# Patient Record
Sex: FEMALE | Race: WHITE | NOT HISPANIC OR LATINO | Employment: FULL TIME | URBAN - METROPOLITAN AREA
[De-identification: names, ages, dates, MRNs, and addresses within clinical notes are randomized per-mention and may not be internally consistent; named-entity substitution may affect disease eponyms.]

---

## 2017-03-17 ENCOUNTER — HOSPITAL ENCOUNTER (EMERGENCY)
Facility: HOSPITAL | Age: 62
Discharge: HOME | End: 2017-03-17
Attending: EMERGENCY MEDICINE | Admitting: EMERGENCY MEDICINE

## 2017-03-17 ENCOUNTER — APPOINTMENT (OUTPATIENT)
Dept: CT IMAGING | Facility: HOSPITAL | Age: 62
End: 2017-03-17

## 2017-03-17 ENCOUNTER — APPOINTMENT (OUTPATIENT)
Dept: GENERAL RADIOLOGY | Facility: HOSPITAL | Age: 62
End: 2017-03-17

## 2017-03-17 VITALS
RESPIRATION RATE: 18 BRPM | HEIGHT: 65 IN | TEMPERATURE: 97.8 F | DIASTOLIC BLOOD PRESSURE: 73 MMHG | HEART RATE: 62 BPM | OXYGEN SATURATION: 96 % | SYSTOLIC BLOOD PRESSURE: 114 MMHG | BODY MASS INDEX: 39.15 KG/M2 | WEIGHT: 235 LBS

## 2017-03-17 DIAGNOSIS — R51.9 ACUTE NONINTRACTABLE HEADACHE, UNSPECIFIED HEADACHE TYPE: ICD-10-CM

## 2017-03-17 DIAGNOSIS — R53.1 GENERALIZED WEAKNESS: Primary | ICD-10-CM

## 2017-03-17 DIAGNOSIS — E86.0 DEHYDRATION: ICD-10-CM

## 2017-03-17 LAB
ALT SERPL W P-5'-P-CCNC: 26 U/L (ref 7–40)
APTT PPP: 28.7 SECONDS (ref 24–31)
AST SERPL-CCNC: 28 U/L (ref 0–33)
BASOPHILS # BLD AUTO: 0.02 10*3/MM3 (ref 0–0.2)
BASOPHILS NFR BLD AUTO: 0.3 % (ref 0–1)
BILIRUB UR QL STRIP: NEGATIVE
BUN BLDA-MCNC: 11 MG/DL (ref 8–26)
CA-I BLDA-SCNC: 1.18 MMOL/L (ref 1.2–1.32)
CHLORIDE BLDA-SCNC: 103 MMOL/L (ref 98–109)
CLARITY UR: CLEAR
CO2 BLDA-SCNC: 26 MMOL/L (ref 24–29)
COLOR UR: YELLOW
CREAT BLDA-MCNC: 0.6 MG/DL (ref 0.6–1.3)
DEPRECATED RDW RBC AUTO: 44.5 FL (ref 37–54)
EOSINOPHIL # BLD AUTO: 0.13 10*3/MM3 (ref 0.1–0.3)
EOSINOPHIL NFR BLD AUTO: 1.9 % (ref 0–3)
ERYTHROCYTE [DISTWIDTH] IN BLOOD BY AUTOMATED COUNT: 14.3 % (ref 11.3–14.5)
GLUCOSE BLDC GLUCOMTR-MCNC: 112 MG/DL (ref 70–130)
GLUCOSE UR STRIP-MCNC: ABNORMAL MG/DL
HCT VFR BLD AUTO: 38.9 % (ref 34.5–44)
HCT VFR BLDA CALC: 38 % (ref 38–51)
HGB BLD-MCNC: 12.3 G/DL (ref 11.5–15.5)
HGB BLDA-MCNC: 12.9 G/DL (ref 12–17)
HGB UR QL STRIP.AUTO: NEGATIVE
HOLD SPECIMEN: NORMAL
HOLD SPECIMEN: NORMAL
IMM GRANULOCYTES # BLD: 0.01 10*3/MM3 (ref 0–0.03)
IMM GRANULOCYTES NFR BLD: 0.1 % (ref 0–0.6)
INR PPP: 0.9 (ref 0.8–1.2)
KETONES UR QL STRIP: NEGATIVE
LEUKOCYTE ESTERASE UR QL STRIP.AUTO: NEGATIVE
LYMPHOCYTES # BLD AUTO: 1.71 10*3/MM3 (ref 0.6–4.8)
LYMPHOCYTES NFR BLD AUTO: 24.7 % (ref 24–44)
MCH RBC QN AUTO: 27.3 PG (ref 27–31)
MCHC RBC AUTO-ENTMCNC: 31.6 G/DL (ref 32–36)
MCV RBC AUTO: 86.3 FL (ref 80–99)
MONOCYTES # BLD AUTO: 0.62 10*3/MM3 (ref 0–1)
MONOCYTES NFR BLD AUTO: 9 % (ref 0–12)
NEUTROPHILS # BLD AUTO: 4.43 10*3/MM3 (ref 1.5–8.3)
NEUTROPHILS NFR BLD AUTO: 64 % (ref 41–71)
NITRITE UR QL STRIP: NEGATIVE
PH UR STRIP.AUTO: 5.5 [PH] (ref 5–8)
PLATELET # BLD AUTO: 311 10*3/MM3 (ref 150–450)
PMV BLD AUTO: 10.2 FL (ref 6–12)
POTASSIUM BLDA-SCNC: 4.1 MMOL/L (ref 3.5–4.9)
PROT UR QL STRIP: NEGATIVE
PROTHROMBIN TIME: 10.9 SECONDS (ref 12.8–15.2)
RBC # BLD AUTO: 4.51 10*6/MM3 (ref 3.89–5.14)
SODIUM BLDA-SCNC: 140 MMOL/L (ref 138–146)
SP GR UR STRIP: 1.04 (ref 1–1.03)
T4 SERPL-MCNC: 6.5 MCG/DL (ref 4.7–11.4)
TROPONIN I SERPL-MCNC: 0 NG/ML (ref 0–0.07)
TSH SERPL DL<=0.05 MIU/L-ACNC: 2.1 MIU/ML (ref 0.35–5.35)
UROBILINOGEN UR QL STRIP: ABNORMAL
WBC NRBC COR # BLD: 6.92 10*3/MM3 (ref 3.5–10.8)
WHOLE BLOOD HOLD SPECIMEN: NORMAL
WHOLE BLOOD HOLD SPECIMEN: NORMAL

## 2017-03-17 PROCEDURE — 85014 HEMATOCRIT: CPT

## 2017-03-17 PROCEDURE — 85610 PROTHROMBIN TIME: CPT

## 2017-03-17 PROCEDURE — 84484 ASSAY OF TROPONIN QUANT: CPT

## 2017-03-17 PROCEDURE — 84450 TRANSFERASE (AST) (SGOT): CPT | Performed by: EMERGENCY MEDICINE

## 2017-03-17 PROCEDURE — 80047 BASIC METABLC PNL IONIZED CA: CPT

## 2017-03-17 PROCEDURE — 71010 HC CHEST PA OR AP: CPT

## 2017-03-17 PROCEDURE — 85730 THROMBOPLASTIN TIME PARTIAL: CPT | Performed by: EMERGENCY MEDICINE

## 2017-03-17 PROCEDURE — 84436 ASSAY OF TOTAL THYROXINE: CPT | Performed by: EMERGENCY MEDICINE

## 2017-03-17 PROCEDURE — P9612 CATHETERIZE FOR URINE SPEC: HCPCS

## 2017-03-17 PROCEDURE — 70450 CT HEAD/BRAIN W/O DYE: CPT

## 2017-03-17 PROCEDURE — 85025 COMPLETE CBC W/AUTO DIFF WBC: CPT | Performed by: EMERGENCY MEDICINE

## 2017-03-17 PROCEDURE — 84460 ALANINE AMINO (ALT) (SGPT): CPT | Performed by: EMERGENCY MEDICINE

## 2017-03-17 PROCEDURE — 81003 URINALYSIS AUTO W/O SCOPE: CPT | Performed by: EMERGENCY MEDICINE

## 2017-03-17 PROCEDURE — 93005 ELECTROCARDIOGRAM TRACING: CPT | Performed by: EMERGENCY MEDICINE

## 2017-03-17 PROCEDURE — 84443 ASSAY THYROID STIM HORMONE: CPT | Performed by: EMERGENCY MEDICINE

## 2017-03-17 PROCEDURE — 99285 EMERGENCY DEPT VISIT HI MDM: CPT

## 2017-03-17 RX ORDER — RAMIPRIL 10 MG/1
10 CAPSULE ORAL DAILY
COMMUNITY

## 2017-03-17 RX ORDER — ESCITALOPRAM OXALATE 20 MG/1
20 TABLET ORAL DAILY
COMMUNITY

## 2017-03-17 RX ORDER — OMEPRAZOLE 20 MG/1
20 CAPSULE, DELAYED RELEASE ORAL DAILY
COMMUNITY

## 2017-03-17 RX ORDER — SODIUM CHLORIDE 0.9 % (FLUSH) 0.9 %
10 SYRINGE (ML) INJECTION AS NEEDED
Status: DISCONTINUED | OUTPATIENT
Start: 2017-03-17 | End: 2017-03-17 | Stop reason: HOSPADM

## 2017-03-17 RX ORDER — ASPIRIN 81 MG/1
81 TABLET ORAL DAILY
COMMUNITY

## 2017-03-17 RX ORDER — INSULIN GLARGINE 100 [IU]/ML
45 INJECTION, SOLUTION SUBCUTANEOUS NIGHTLY
COMMUNITY

## 2017-03-17 RX ADMIN — SODIUM CHLORIDE 1000 ML: 9 INJECTION, SOLUTION INTRAVENOUS at 12:30

## 2017-03-17 NOTE — DISCHARGE INSTRUCTIONS
Push fluids. If you become light-headed lie down and raise your legs.   Follow up with Dr. Brock at next available, call to make an appointment.   Return to ED for worsening concerns or if new concerns may arise.      Please review the medications you are supposed to be taking according to prior physician recommendations. I have not changed your home medications during this visit. If your discharge instructions indicate that I have changed your home medications, this is not the case, and you should disregard. If you have any questions about the medication you should be taking at home, please call your physician.

## 2017-03-17 NOTE — ED PROVIDER NOTES
Subjective   HPI Comments: Kendra Lares is a 61 y.o.female who presents to the ED with multiple complaints. The patient reports she woke up this morning at 0400 with a headache and pain in posterior head. She notes she took advil at that time, with relief. She states she went to work and continued to experience worsening generalized weakness, presenting into the ED for evaluation. Additionally, the patient c/o light-headedness, dizziness, ataxia, blurred vision, and a heaviness in her chest, but denies any other acute complaints at this time.   The patient reports she had two eye surgeries on her right eye at the end of last year due to a detached retina.     Patient is a 61 y.o. female presenting with weakness.   History provided by:  Patient  Weakness - Generalized   Severity:  Moderate  Onset quality:  Sudden  Duration: 0830 while at work.  Timing:  Constant  Progression:  Worsening  Chronicity:  New  Context comment:  Began experiencing headache at 0400 this morning, while at work experienced worsening generalized weakness  Relieved by:  None tried  Ineffective treatments:  None tried  Associated symptoms: ataxia, chest pain (heaviness in chest), dizziness, headaches and vision change (blurred vision)    Associated symptoms: no cough, no diarrhea, no falls, no fever, no loss of consciousness, no nausea, no shortness of breath and no vomiting        Review of Systems   Constitutional: Negative for chills and fever.   HENT: Negative for congestion, rhinorrhea and sore throat.    Respiratory: Negative for cough and shortness of breath.    Cardiovascular: Positive for chest pain (heaviness in chest).   Gastrointestinal: Negative for diarrhea, nausea and vomiting.   Musculoskeletal: Positive for gait problem. Negative for back pain, falls and neck pain.   Neurological: Positive for dizziness, weakness (generalized), light-headedness and headaches. Negative for loss of consciousness.   All other systems reviewed and  are negative.      Past Medical History   Diagnosis Date   • Diabetes mellitus    • Diverticulosis    • GERD (gastroesophageal reflux disease)    • Hypertension    • Migraine        No Known Allergies    Past Surgical History   Procedure Laterality Date   • Retinal detachment repair     • Colon resection     • Hernia repair         History reviewed. No pertinent family history.    Social History     Social History   • Marital status:      Spouse name: N/A   • Number of children: N/A   • Years of education: N/A     Social History Main Topics   • Smoking status: Never Smoker   • Smokeless tobacco: None   • Alcohol use No   • Drug use: No   • Sexual activity: Not Asked     Other Topics Concern   • None     Social History Narrative   • None         Objective   Physical Exam   Constitutional: She is oriented to person, place, and time. She appears well-developed and well-nourished. No distress.   NIH 0    HENT:   Head: Normocephalic and atraumatic.   Eyes: Conjunctivae are normal. No scleral icterus.   Neck: Normal range of motion. Neck supple. Carotid bruit is not present.   Cardiovascular: Normal rate, regular rhythm and normal heart sounds.    Pulmonary/Chest: Effort normal and breath sounds normal. No respiratory distress.   Abdominal: Soft. Bowel sounds are normal. There is no tenderness.   Musculoskeletal: Normal range of motion. She exhibits no edema.   Neurological: She is alert and oriented to person, place, and time.   The patient is slightly slow to answer questions.   Oriented x 3.   Skin: Skin is warm and dry.   Psychiatric: She has a normal mood and affect.   Nursing note and vitals reviewed.      Procedures         ED Course  ED Course   Comment By Time   Upon re-check patient reports she is feeling a little bit better. -REBECCA Brewster [facility] 03/17 8307   Discussed results and plan of care with the patient, who is agreeable with the plan. -REBECCA Brewster [facility] 03/17 5220   Patient was given  1L normal saline, and upon re-check she has not felt urged to urinate at that time. Spoke with her in detail about proper hydration status and mandatory re-check if symptoms return/worsen. -REBECCA Nevarez 03/17 1634     Recent Results (from the past 24 hour(s))   POC Protime / INR    Collection Time: 03/17/17 11:09 AM   Result Value Ref Range    Protime 10.9 (L) 12.8 - 15.2 seconds    INR 0.9 0.8 - 1.2   POC CHEM 8    Collection Time: 03/17/17 11:10 AM   Result Value Ref Range    Glucose 112 70 - 130 mg/dL    BUN, Arterial 11 8 - 26 mg/dL    Creatinine 0.60 0.60 - 1.30 mg/dL    Sodium 140 138 - 146 mmol/L    Potassium 4.1 3.5 - 4.9 mmol/L    Chloride 103 98 - 109 mmol/L    Total CO2 26 24 - 29 mmol/L    Hemoglobin 12.9 12.0 - 17.0 g/dL    Hematocrit 38 38 - 51 %    Ionized Calcium 1.18 (L) 1.20 - 1.32 mmol/L   POC Troponin, Rapid    Collection Time: 03/17/17 11:11 AM   Result Value Ref Range    Troponin I 0.00 0.00 - 0.07 ng/mL   AST    Collection Time: 03/17/17 11:16 AM   Result Value Ref Range    AST (SGOT) 28 0 - 33 U/L   ALT    Collection Time: 03/17/17 11:16 AM   Result Value Ref Range    ALT (SGPT) 26 7 - 40 U/L   Green Top (Gel)    Collection Time: 03/17/17 11:16 AM   Result Value Ref Range    Extra Tube Hold for add-ons.    TSH    Collection Time: 03/17/17 11:16 AM   Result Value Ref Range    TSH 2.102 0.350 - 5.350 mIU/mL   T4    Collection Time: 03/17/17 11:16 AM   Result Value Ref Range    T4, Total 6.50 4.70 - 11.40 mcg/dL   aPTT    Collection Time: 03/17/17 11:17 AM   Result Value Ref Range    PTT 28.7 24.0 - 31.0 seconds   Light Blue Top    Collection Time: 03/17/17 11:17 AM   Result Value Ref Range    Extra Tube hold for add-on    Lavender Top    Collection Time: 03/17/17 11:17 AM   Result Value Ref Range    Extra Tube hold for add-on    Gold Top - SST    Collection Time: 03/17/17 11:17 AM   Result Value Ref Range    Extra Tube Hold for add-ons.    CBC Auto Differential    Collection Time:  03/17/17 11:17 AM   Result Value Ref Range    WBC 6.92 3.50 - 10.80 10*3/mm3    RBC 4.51 3.89 - 5.14 10*6/mm3    Hemoglobin 12.3 11.5 - 15.5 g/dL    Hematocrit 38.9 34.5 - 44.0 %    MCV 86.3 80.0 - 99.0 fL    MCH 27.3 27.0 - 31.0 pg    MCHC 31.6 (L) 32.0 - 36.0 g/dL    RDW 14.3 11.3 - 14.5 %    RDW-SD 44.5 37.0 - 54.0 fl    MPV 10.2 6.0 - 12.0 fL    Platelets 311 150 - 450 10*3/mm3    Neutrophil % 64.0 41.0 - 71.0 %    Lymphocyte % 24.7 24.0 - 44.0 %    Monocyte % 9.0 0.0 - 12.0 %    Eosinophil % 1.9 0.0 - 3.0 %    Basophil % 0.3 0.0 - 1.0 %    Immature Grans % 0.1 0.0 - 0.6 %    Neutrophils, Absolute 4.43 1.50 - 8.30 10*3/mm3    Lymphocytes, Absolute 1.71 0.60 - 4.80 10*3/mm3    Monocytes, Absolute 0.62 0.00 - 1.00 10*3/mm3    Eosinophils, Absolute 0.13 0.10 - 0.30 10*3/mm3    Basophils, Absolute 0.02 0.00 - 0.20 10*3/mm3    Immature Grans, Absolute 0.01 0.00 - 0.03 10*3/mm3   Urinalysis With / Culture If Indicated    Collection Time: 03/17/17  2:45 PM   Result Value Ref Range    Color, UA Yellow Yellow, Straw    Appearance, UA Clear Clear    pH, UA 5.5 5.0 - 8.0    Specific Gravity, UA 1.036 (H) 1.001 - 1.030    Glucose, UA >=1000 mg/dL (3+) (A) Negative    Ketones, UA Negative Negative    Bilirubin, UA Negative Negative    Blood, UA Negative Negative    Protein, UA Negative Negative    Leuk Esterase, UA Negative Negative    Nitrite, UA Negative Negative    Urobilinogen, UA 0.2 E.U./dL 0.2 - 1.0 E.U./dL     Note: In addition to lab results from this visit, the labs listed above may include labs taken at another facility or during a different encounter within the last 24 hours. Please correlate lab times with ED admission and discharge times for further clarification of the services performed during this visit.    XR Chest 1 View   Preliminary Result   1.  Borderline heart.   2.  No active disease.       D:  03/17/2017   E:  03/17/2017                  CT Head Without Contrast   Final Result   1. Negative  noncontrast CT data set of the brain. Evolving ischemic   insult or hemorrhage is not currently identified.   2. Datasets were complete at 11:00 AM and the report rendered   immediately to the emergency physician in attendance.       D:  03/17/2017   E:  03/17/2017           This report was finalized on 3/17/2017 3:29 PM by Dr. Peter May MD.            Vitals:    03/17/17 1515 03/17/17 1530 03/17/17 1544 03/17/17 1600   BP:  118/67  113/55   BP Location:       Patient Position:       Pulse: 60  60 65   Resp:       Temp:       TempSrc:       SpO2: 92% 91% 92% 95%   Weight:       Height:         Medications   sodium chloride 0.9 % flush 10 mL (not administered)   sodium chloride 0.9 % bolus 1,000 mL (0 mL Intravenous Stopped 3/17/17 1345)     ECG/EMG Results (last 24 hours)     Procedure Component Value Units Date/Time    ECG 12 Lead [[id number]] Collected:  03/17/17 1126     Updated:  03/17/17 1322    Narrative:       Test Reason : Acute Stroke Protocol (onset < 12 hrs)  Blood Pressure : **/** mmHG  Vent. Rate : 062 BPM     Atrial Rate : 062 BPM     P-R Int : 144 ms          QRS Dur : 080 ms      QT Int : 424 ms       P-R-T Axes : 006 -11 022 degrees     QTc Int : 430 ms    Normal sinus rhythm  Normal ECG  When compared with ECG of 17-AUG-2014 22:31,  No significant change was found  Confirmed by SHANNEN DEL ROSARIO MD ([id number]) on 3/17/2017 1:22:03 PM    Referred By:  PELON           Confirmed By:SHANNEN DEL ROSARIO MD                  [facility]    Final diagnoses:   Generalized weakness   Dehydration   Acute nonintractable headache, unspecified headache type       Documentation assistance provided by turner Nevarez.  Information recorded by the scribe was done at my direction and has been verified and validated by me.     Georgina Nevarez  03/17/17 1120       Georgina Nevarez  03/17/17 1638       Georgina Nevarez  03/17/17 1654       Shannen Del Rosario MD  03/17/17 8777

## 2017-07-19 ENCOUNTER — HOSPITAL ENCOUNTER (OUTPATIENT)
Facility: HOSPITAL | Age: 62
Setting detail: HOSPITAL OUTPATIENT SURGERY
Discharge: HOME | End: 2017-07-19
Attending: OPHTHALMOLOGY | Admitting: OPHTHALMOLOGY

## 2017-07-19 ENCOUNTER — ANESTHESIA EVENT (OUTPATIENT)
Dept: PERIOP | Facility: HOSPITAL | Age: 62
End: 2017-07-19

## 2017-07-19 ENCOUNTER — ANESTHESIA (OUTPATIENT)
Dept: PERIOP | Facility: HOSPITAL | Age: 62
End: 2017-07-19

## 2017-07-19 VITALS
SYSTOLIC BLOOD PRESSURE: 155 MMHG | HEIGHT: 63 IN | BODY MASS INDEX: 38.62 KG/M2 | DIASTOLIC BLOOD PRESSURE: 75 MMHG | OXYGEN SATURATION: 98 % | WEIGHT: 218 LBS | HEART RATE: 54 BPM | TEMPERATURE: 98.1 F | RESPIRATION RATE: 16 BRPM

## 2017-07-19 PROBLEM — H26.9 CATARACT OF RIGHT EYE: Status: ACTIVE | Noted: 2017-07-19

## 2017-07-19 PROBLEM — H26.9 CATARACT OF RIGHT EYE: Status: RESOLVED | Noted: 2017-07-19 | Resolved: 2017-07-19

## 2017-07-19 LAB — GLUCOSE BLDC GLUCOMTR-MCNC: 97 MG/DL (ref 70–130)

## 2017-07-19 PROCEDURE — 82962 GLUCOSE BLOOD TEST: CPT

## 2017-07-19 PROCEDURE — 25010000002 EPINEPHRINE 1 MG/ML SOLUTION 1 ML VIAL: Performed by: OPHTHALMOLOGY

## 2017-07-19 PROCEDURE — 25010000002 MIDAZOLAM PER 1 MG: Performed by: NURSE ANESTHETIST, CERTIFIED REGISTERED

## 2017-07-19 PROCEDURE — V2787 ASTIGMATISM-CORRECT FUNCTION: HCPCS | Performed by: OPHTHALMOLOGY

## 2017-07-19 PROCEDURE — V2632 POST CHMBR INTRAOCULAR LENS: HCPCS | Performed by: OPHTHALMOLOGY

## 2017-07-19 DEVICE — IMPLANTABLE DEVICE: Type: IMPLANTABLE DEVICE | Site: EYE | Status: FUNCTIONAL

## 2017-07-19 RX ORDER — PREDNISOLONE ACETATE 10 MG/ML
SUSPENSION/ DROPS OPHTHALMIC AS NEEDED
Status: DISCONTINUED | OUTPATIENT
Start: 2017-07-19 | End: 2017-07-19 | Stop reason: HOSPADM

## 2017-07-19 RX ORDER — SODIUM CHLORIDE, SODIUM LACTATE, POTASSIUM CHLORIDE, CALCIUM CHLORIDE 600; 310; 30; 20 MG/100ML; MG/100ML; MG/100ML; MG/100ML
1000 INJECTION, SOLUTION INTRAVENOUS CONTINUOUS PRN
Status: DISCONTINUED | OUTPATIENT
Start: 2017-07-19 | End: 2017-08-10 | Stop reason: HOSPADM

## 2017-07-19 RX ORDER — BALANCED SALT SOLUTION 6.4; .75; .48; .3; 3.9; 1.7 MG/ML; MG/ML; MG/ML; MG/ML; MG/ML; MG/ML
SOLUTION OPHTHALMIC AS NEEDED
Status: DISCONTINUED | OUTPATIENT
Start: 2017-07-19 | End: 2017-07-19 | Stop reason: HOSPADM

## 2017-07-19 RX ORDER — TETRACAINE HYDROCHLORIDE 5 MG/ML
1 SOLUTION OPHTHALMIC SEE ADMIN INSTRUCTIONS
Status: COMPLETED | OUTPATIENT
Start: 2017-07-19 | End: 2017-07-19

## 2017-07-19 RX ORDER — SODIUM CHLORIDE 0.9 % (FLUSH) 0.9 %
1-10 SYRINGE (ML) INJECTION AS NEEDED
Status: DISCONTINUED | OUTPATIENT
Start: 2017-07-19 | End: 2017-08-10 | Stop reason: HOSPADM

## 2017-07-19 RX ORDER — PREDNISOLONE ACETATE 10 MG/ML
SUSPENSION/ DROPS OPHTHALMIC
Status: DISPENSED
Start: 2017-07-19 | End: 2017-07-19

## 2017-07-19 RX ORDER — PILOCARPINE HYDROCHLORIDE 10 MG/ML
SOLUTION/ DROPS OPHTHALMIC AS NEEDED
Status: DISCONTINUED | OUTPATIENT
Start: 2017-07-19 | End: 2017-07-19 | Stop reason: HOSPADM

## 2017-07-19 RX ORDER — CYCLOPENTOLATE HYDROCHLORIDE 20 MG/ML
SOLUTION/ DROPS OPHTHALMIC
Status: COMPLETED
Start: 2017-07-19 | End: 2017-07-19

## 2017-07-19 RX ORDER — PHENYLEPHRINE HYDROCHLORIDE 100 MG/ML
SOLUTION/ DROPS OPHTHALMIC
Status: COMPLETED
Start: 2017-07-19 | End: 2017-07-19

## 2017-07-19 RX ORDER — LIDOCAINE HYDROCHLORIDE 40 MG/ML
INJECTION, SOLUTION RETROBULBAR; TOPICAL AS NEEDED
Status: DISCONTINUED | OUTPATIENT
Start: 2017-07-19 | End: 2017-07-19 | Stop reason: HOSPADM

## 2017-07-19 RX ORDER — PREDNISOLONE ACETATE 10 MG/ML
1 SUSPENSION/ DROPS OPHTHALMIC
Status: DISCONTINUED | OUTPATIENT
Start: 2017-07-19 | End: 2017-08-10 | Stop reason: HOSPADM

## 2017-07-19 RX ORDER — ENALAPRILAT 2.5 MG/2ML
1.25 INJECTION INTRAVENOUS ONCE AS NEEDED
Status: DISCONTINUED | OUTPATIENT
Start: 2017-07-19 | End: 2017-08-10 | Stop reason: HOSPADM

## 2017-07-19 RX ORDER — TETRACAINE HYDROCHLORIDE 5 MG/ML
SOLUTION OPHTHALMIC
Status: COMPLETED
Start: 2017-07-19 | End: 2017-07-19

## 2017-07-19 RX ORDER — CYCLOPENTOLATE HYDROCHLORIDE 20 MG/ML
1 SOLUTION/ DROPS OPHTHALMIC
Status: COMPLETED | OUTPATIENT
Start: 2017-07-19 | End: 2017-07-19

## 2017-07-19 RX ORDER — PHENYLEPHRINE HYDROCHLORIDE 100 MG/ML
1 SOLUTION/ DROPS OPHTHALMIC
Status: COMPLETED | OUTPATIENT
Start: 2017-07-19 | End: 2017-07-19

## 2017-07-19 RX ORDER — MIDAZOLAM HYDROCHLORIDE 1 MG/ML
INJECTION INTRAMUSCULAR; INTRAVENOUS AS NEEDED
Status: DISCONTINUED | OUTPATIENT
Start: 2017-07-19 | End: 2017-07-19 | Stop reason: SURG

## 2017-07-19 RX ORDER — SODIUM CHLORIDE 0.9 % (FLUSH) 0.9 %
3 SYRINGE (ML) INJECTION AS NEEDED
Status: DISCONTINUED | OUTPATIENT
Start: 2017-07-19 | End: 2017-08-10 | Stop reason: HOSPADM

## 2017-07-19 RX ORDER — GATIFLOXACIN 5 MG/ML
1 SOLUTION/ DROPS OPHTHALMIC 4 TIMES DAILY
COMMUNITY
End: 2018-07-24 | Stop reason: HOSPADM

## 2017-07-19 RX ADMIN — CYCLOPENTOLATE HYDROCHLORIDE 1 DROP: 20 SOLUTION/ DROPS OPHTHALMIC at 09:52

## 2017-07-19 RX ADMIN — SODIUM CHLORIDE, POTASSIUM CHLORIDE, SODIUM LACTATE AND CALCIUM CHLORIDE 1000 ML: 600; 310; 30; 20 INJECTION, SOLUTION INTRAVENOUS at 10:08

## 2017-07-19 RX ADMIN — TETRACAINE HYDROCHLORIDE 1 DROP: 5 SOLUTION OPHTHALMIC at 09:49

## 2017-07-19 RX ADMIN — TETRACAINE HYDROCHLORIDE 1 DROP: 5 SOLUTION OPHTHALMIC at 09:50

## 2017-07-19 RX ADMIN — MIDAZOLAM HYDROCHLORIDE 1 MG: 1 INJECTION, SOLUTION INTRAMUSCULAR; INTRAVENOUS at 11:15

## 2017-07-19 RX ADMIN — PHENYLEPHRINE HYDROCHLORIDE 1 DROP: 100 SOLUTION/ DROPS OPHTHALMIC at 09:52

## 2017-07-19 RX ADMIN — PHENYLEPHRINE HYDROCHLORIDE 1 DROP: 100 SOLUTION/ DROPS OPHTHALMIC at 10:02

## 2017-07-19 RX ADMIN — MIDAZOLAM HYDROCHLORIDE 1 MG: 1 INJECTION, SOLUTION INTRAMUSCULAR; INTRAVENOUS at 11:08

## 2017-07-19 RX ADMIN — PHENYLEPHRINE HYDROCHLORIDE 1 DROP: 100 SOLUTION/ DROPS OPHTHALMIC at 09:57

## 2017-07-19 RX ADMIN — MIDAZOLAM HYDROCHLORIDE 1 MG: 1 INJECTION, SOLUTION INTRAMUSCULAR; INTRAVENOUS at 11:22

## 2017-07-19 RX ADMIN — CYCLOPENTOLATE HYDROCHLORIDE 1 DROP: 20 SOLUTION/ DROPS OPHTHALMIC at 10:02

## 2017-07-19 RX ADMIN — CYCLOPENTOLATE HYDROCHLORIDE 1 DROP: 20 SOLUTION/ DROPS OPHTHALMIC at 09:57

## 2017-07-19 NOTE — ANESTHESIA PREPROCEDURE EVALUATION
Anesthesia Evaluation     Patient summary reviewed and Nursing notes reviewed   no history of anesthetic complications:  NPO Solid Status: > 8 hours  NPO Liquid Status: > 8 hours     Airway   Mallampati: II  Neck ROM: full  no difficulty expected  Dental - normal exam     Pulmonary - normal exam    breath sounds clear to auscultation  (+) sleep apnea on CPAP,   Cardiovascular - normal exam    PT is on anticoagulation therapy  Rhythm: regular  Rate: normal    (+) hypertension well controlled,       Neuro/Psych  (+) CVA, headaches, psychiatric history Depression,    GI/Hepatic/Renal/Endo    (+) obesity,  diabetes mellitus type 1,     Musculoskeletal (-) negative ROS    Abdominal    Substance History - negative use     OB/GYN negative ob/gyn ROS         Other      history of cancer remission    ROS/Med Hx Other: Hx of ovarian CA at age 20  BMI 38                                  Anesthesia Plan    ASA 3     MAC     Anesthetic plan and risks discussed with patient.

## 2017-07-19 NOTE — ANESTHESIA POSTPROCEDURE EVALUATION
Patient: Kendra Lares    Procedure Summary     Date Anesthesia Start Anesthesia Stop Room / Location    07/19/17 1107 1136  [facility] /  [facility]       Procedure Diagnosis Surgeon Provider    CATARACT PHACO EXTRACTION WITH INTRAOCULAR LENS IMPLANT RIGHT WITH TORIC LENS (Right Eye) No diagnosis on file. MD James Crisostomo CRNA          Anesthesia Type: MAC  Last vitals  BP   155/75 (07/19/17 1200)    Temp   98.1 °F (36.7 °C) (07/19/17 1140)    Pulse   54 (07/19/17 1200)   Resp   16 (07/19/17 1200)    SpO2   98 % (07/19/17 1200)      Post Anesthesia Care and Evaluation    Patient location during evaluation: bedside  Patient participation: complete - patient participated  Level of consciousness: awake and alert  Pain management: satisfactory to patient  Airway patency: patent  Anesthetic complications: No anesthetic complications  PONV Status: controlled  Cardiovascular status: acceptable and stable  Respiratory status: acceptable  Hydration status: acceptable

## 2017-07-31 ENCOUNTER — OFFICE VISIT (OUTPATIENT)
Dept: SURGERY | Facility: CLINIC | Age: 62
End: 2017-07-31

## 2017-07-31 VITALS
HEIGHT: 63 IN | DIASTOLIC BLOOD PRESSURE: 62 MMHG | BODY MASS INDEX: 39.23 KG/M2 | TEMPERATURE: 97.9 F | SYSTOLIC BLOOD PRESSURE: 120 MMHG | OXYGEN SATURATION: 98 % | RESPIRATION RATE: 16 BRPM | HEART RATE: 70 BPM | WEIGHT: 221.4 LBS

## 2017-07-31 DIAGNOSIS — K43.2 INCISIONAL HERNIA, WITHOUT OBSTRUCTION OR GANGRENE: Primary | ICD-10-CM

## 2017-07-31 PROCEDURE — 99204 OFFICE O/P NEW MOD 45 MIN: CPT | Performed by: SURGERY

## 2017-07-31 RX ORDER — NEPAFENAC 0.3 %
SUSPENSION, DROPS(FINAL DOSAGE FORM)(ML) OPHTHALMIC (EYE)
Refills: 0 | COMMUNITY
Start: 2017-07-11 | End: 2018-07-24 | Stop reason: HOSPADM

## 2017-07-31 RX ORDER — GLIPIZIDE 10 MG/1
TABLET, FILM COATED, EXTENDED RELEASE ORAL
Refills: 0 | COMMUNITY
Start: 2017-07-24

## 2017-07-31 NOTE — PROGRESS NOTES
Patient: Kendra Lares    YOB: 1955    Date: 07/31/2017    Primary Care Provider: Enmanuel Brock MD    Reason for Consultation: hernia    Chief complaint:   Chief Complaint   Patient presents with   • Hernia       Subjective .     History of present illness:  Patient had a hernia repair 6-7 years ago with Dr Roberts. She c/o a knot above her navel that protrudes more upon standing and with eating, drinking carbonated beverages and standing on her feet for long periods at a time. Her pain is constant and about a 4 on the pain scale. Patient had 2 colectomies in the past and laparoscopic incisional hernia repair.  She is concerned she has developed a recurrent hernia.    Review of Systems   Constitutional: Negative for chills, fever and unexpected weight change.   HENT: Negative for hearing loss, trouble swallowing and voice change.    Eyes: Negative for visual disturbance.   Respiratory: Negative for apnea, cough, chest tightness, shortness of breath and wheezing.    Cardiovascular: Negative for chest pain, palpitations and leg swelling.   Gastrointestinal: Positive for abdominal pain and nausea. Negative for abdominal distention, anal bleeding, blood in stool, constipation, diarrhea, rectal pain and vomiting.   Endocrine: Negative for cold intolerance and heat intolerance.   Genitourinary: Negative for difficulty urinating, dysuria and flank pain.   Musculoskeletal: Negative for back pain and gait problem.   Skin: Negative for color change, rash and wound.   Neurological: Negative for dizziness, syncope, speech difficulty, weakness, light-headedness, numbness and headaches.   Hematological: Negative for adenopathy. Does not bruise/bleed easily.   Psychiatric/Behavioral: Negative for confusion. The patient is not nervous/anxious.        Allergies:  No Known Allergies    Medications:  No current facility-administered medications for this visit.   No current outpatient prescriptions on  "file.    Facility-Administered Medications Ordered in Other Visits:   •  enalaprilat (VASOTEC) injection 1.25 mg, 1.25 mg, Intravenous, Once PRN, Aleja Wallace MD  •  lactated ringers infusion 1,000 mL, 1,000 mL, Intravenous, Continuous PRN, Aleja Wallace MD, Stopped at 07/19/17 1149  •  prednisoLONE acetate (PRED FORTE) 1 % ophthalmic suspension 1 drop, 1 drop, Right Eye, Q1H While Awake, Aleja Wallace MD  •  sodium chloride 0.9 % flush 1-10 mL, 1-10 mL, Intravenous, PRN, Aleja Wallace MD  •  sodium chloride 0.9 % flush 3 mL, 3 mL, Intravenous, PRN, Aleja Wallace MD    History\"  Past Medical History:   Diagnosis Date   • Body piercing     EARS   • Cancer     REPORTS PRECANCEROUS CELLS ON OVARIES AT AGE 20 - PATIENT HAD A COMPLETE HYSTERECTOMY AT AGE 20 DUE TO THIS   • Depression    • Diabetes mellitus    • Diverticulosis    • Fracture     RIGHT ANKLE   • GERD (gastroesophageal reflux disease)    • [place] (hard of hearing)     WEARS HEARING AIDS   • Hypertension    • Migraine    • Sleep apnea     USES CPAP HS   • Stroke     REPORTS HAD 2 EPISODES THAT HAVE NOT BEEN PROVEN AS CVA'S BUT THAT THEY THINK SHE HAD STROKES. MOST RECENT REPORTED TO BE NOVEMBER 2016.    • Wears partial dentures     UPPER MOUTH       Past Surgical History:   Procedure Laterality Date   • CATARACT EXTRACTION W/ INTRAOCULAR LENS IMPLANT Right 7/19/2017    Procedure: CATARACT PHACO EXTRACTION WITH INTRAOCULAR LENS IMPLANT RIGHT WITH TORIC LENS;  Surgeon: Aleja Wallace MD;  [facility]: [facility];  Service:    • COLON RESECTION      REPORTS 2 RESECTIONS BY DR LEAL   • COLONOSCOPY W/ POLYPECTOMY     • ENDOSCOPY     • HERNIA REPAIR      ABDOMINAL, REPORTS X2 SURGERIES IN THE PAST.  DR LEAL X1, DR AVERY X1.   • HYSTERECTOMY      TOTAL HYSTERECTOMY AT AGE 20   • LAPAROSCOPIC CHOLECYSTECTOMY     • RETINAL DETACHMENT REPAIR Right        History reviewed. No pertinent family " "history.    Social History   Substance Use Topics   • Smoking status: Former Smoker     Packs/day: 0.25     Years: 8.00     Types: Cigarettes   • Smokeless tobacco: Never Used      Comment: REPORTS SHE QUIT APPROXIMATELY 25 YEARS AGO, ONLY ON A SOCIAL LEVEL   • Alcohol use No        Objective     Vital Signs:   /62  Pulse 70  Temp 97.9 °F (36.6 °C) (Temporal Artery )   Resp 16  Ht 63\" (160 cm)  Wt 221 lb 6.4 oz (100 kg)  SpO2 98%  BMI 39.22 kg/m2    Physical Exam:   General Appearance:    Alert, cooperative, in no acute distress   Head:    Normocephalic, without obvious abnormality, atraumatic   Eyes:            Lids and lashes normal, conjunctivae and sclerae normal, no   icterus, no pallor, corneas clear, PERRLA   Ears:    Ears appear intact with no abnormalities noted   Throat:   No oral lesions, no thrush, oral mucosa moist   Neck:   No adenopathy, supple, trachea midline, no thyromegaly, no   carotid bruit, no JVD   Lungs:     Clear to auscultation,respirations regular, even and                  unlabored    Heart:    Regular rhythm and normal rate, normal S1 and S2, no            murmur, no gallop, no rub, no click   Chest Wall:    No abnormalities observed   Abdomen:     Normal bowel sounds, no masses, no organomegaly, soft        non-tender, non-distended, no guarding, no rebound                Tenderness.  Large periumbilical incisional hernia, 15 x 15 cm.  Difficult to reduce.     Extremities:   Moves all extremities well, no edema, no cyanosis, no             redness   Pulses:   Pulses palpable and equal bilaterally   Skin:   No bleeding, bruising or rash   Lymph nodes:   No palpable adenopathy   Neurologic:   Cranial nerves 2 - 12 grossly intact, sensation intact, DTR       present and equal bilaterally     Results Review:   I reviewed the patient's new clinical results.    Assessment/Plan     1. Incisional hernia, without obstruction or gangrene        I had a long discussion with the " patient and her .  I would like to refer her to [facility] . Dr. Warner Grullon for evaluation for possible repair.  Also encouraged patient to lose weight, it might make repair easier and give her a lower chance or recurrence.    I discussed the patients findings and my recommendations with patient    Electronically signed by Alycia Roberts MD  07/31/17      .

## 2017-08-08 DIAGNOSIS — K43.2 INCISIONAL HERNIA, WITHOUT OBSTRUCTION OR GANGRENE: Primary | ICD-10-CM

## 2017-08-13 ENCOUNTER — RESULTS ENCOUNTER (OUTPATIENT)
Dept: SURGERY | Facility: CLINIC | Age: 62
End: 2017-08-13

## 2017-08-13 DIAGNOSIS — K43.2 INCISIONAL HERNIA, WITHOUT OBSTRUCTION OR GANGRENE: ICD-10-CM

## 2018-07-24 ENCOUNTER — HOSPITAL ENCOUNTER (OUTPATIENT)
Facility: HOSPITAL | Age: 63
Setting detail: HOSPITAL OUTPATIENT SURGERY
Discharge: HOME | End: 2018-07-24
Attending: OPHTHALMOLOGY | Admitting: OPHTHALMOLOGY

## 2018-07-24 VITALS
RESPIRATION RATE: 18 BRPM | HEART RATE: 82 BPM | TEMPERATURE: 97.4 F | WEIGHT: 211 LBS | SYSTOLIC BLOOD PRESSURE: 126 MMHG | DIASTOLIC BLOOD PRESSURE: 61 MMHG | OXYGEN SATURATION: 96 % | BODY MASS INDEX: 38.83 KG/M2 | HEIGHT: 62 IN

## 2018-07-24 PROBLEM — H26.491 POSTERIOR CAPSULAR OPACIFICATION OF RIGHT EYE, OBSCURING VISION: Status: ACTIVE | Noted: 2018-07-24

## 2018-07-24 PROBLEM — H26.491 POSTERIOR CAPSULAR OPACIFICATION OF RIGHT EYE, OBSCURING VISION: Status: RESOLVED | Noted: 2018-07-24 | Resolved: 2018-07-24

## 2018-07-24 RX ORDER — PREDNISOLONE ACETATE 10 MG/ML
SUSPENSION/ DROPS OPHTHALMIC AS NEEDED
Status: DISCONTINUED | OUTPATIENT
Start: 2018-07-24 | End: 2018-07-24 | Stop reason: HOSPADM

## 2018-07-24 RX ORDER — SODIUM CHLORIDE 0.9 % (FLUSH) 0.9 %
3 SYRINGE (ML) INJECTION AS NEEDED
Status: DISCONTINUED | OUTPATIENT
Start: 2018-07-24 | End: 2018-07-24 | Stop reason: HOSPADM

## 2018-07-24 RX ORDER — BALANCED SALT SOLUTION 6.4; .75; .48; .3; 3.9; 1.7 MG/ML; MG/ML; MG/ML; MG/ML; MG/ML; MG/ML
SOLUTION OPHTHALMIC AS NEEDED
Status: DISCONTINUED | OUTPATIENT
Start: 2018-07-24 | End: 2018-07-24 | Stop reason: HOSPADM

## 2018-07-24 RX ORDER — SODIUM CHLORIDE, SODIUM LACTATE, POTASSIUM CHLORIDE, CALCIUM CHLORIDE 600; 310; 30; 20 MG/100ML; MG/100ML; MG/100ML; MG/100ML
1000 INJECTION, SOLUTION INTRAVENOUS CONTINUOUS
Status: DISCONTINUED | OUTPATIENT
Start: 2018-07-24 | End: 2018-07-24 | Stop reason: HOSPADM

## 2018-07-24 RX ORDER — CYCLOPENTOLATE HYDROCHLORIDE 20 MG/ML
1 SOLUTION/ DROPS OPHTHALMIC ONCE
Status: COMPLETED | OUTPATIENT
Start: 2018-07-24 | End: 2018-07-24

## 2018-07-24 RX ORDER — TETRACAINE HYDROCHLORIDE 5 MG/ML
SOLUTION OPHTHALMIC AS NEEDED
Status: DISCONTINUED | OUTPATIENT
Start: 2018-07-24 | End: 2018-07-24 | Stop reason: HOSPADM

## 2018-07-24 RX ORDER — PHENYLEPHRINE HYDROCHLORIDE 100 MG/ML
1 SOLUTION/ DROPS OPHTHALMIC ONCE
Status: COMPLETED | OUTPATIENT
Start: 2018-07-24 | End: 2018-07-24

## 2018-07-24 RX ADMIN — PHENYLEPHRINE HYDROCHLORIDE 1 DROP: 100 SOLUTION/ DROPS OPHTHALMIC at 13:50

## 2018-07-24 RX ADMIN — APRACLONIDINE HYDROCHLORIDE 1 DROP: 10 SOLUTION/ DROPS OPHTHALMIC at 13:55

## 2018-07-24 RX ADMIN — CYCLOPENTOLATE HYDROCHLORIDE 1 DROP: 20 SOLUTION/ DROPS OPHTHALMIC at 13:50

## 2018-07-24 NOTE — OP NOTE
Pre-op Diagnosis: Posterior Capsular Opacification, Right eye  Procedure: Nd: YAG Laser Capsulotomy, Right eye    Post-op Diagnosis: Posterior Capsular Opacification, Right eye    Indications:  Kendra Lares is a 62 y.o. female with Posterior Capsular Opacification. It was recommended that the next step in her management be a Nd:YAG laser capsulotomy in the right eye.      Procedure: The right eye was dilated prior to arrival in the laser suite.  A drop of topical Tetracaine was instilled in the right conjunctival cul-de-sac and the patient was then positioned in front of the Nd:YAG laser. The correct eye was again confirmed verbally by both the patient and the surgeon.  The laser contact lens was positioned on the right eye and the capsulotomy was carried out without difficulty using the following parameters:    Laser: Nd:YAG  Spot Size: Fixed  Burst Mode: I  Power Settin.1 mJ/burst  Number of shots: 23  Total energy delivered: 121 mJ    Immediately following the procedure, the right eye was rinsed with BSS solution and a drop of prednisolone acetate 1% was applied.    The patient tolerated the procedure without difficulty. No complications were encountered.    The patient was discharged home with the appropriate instructions.    Aleja Wallace MD   2018  2:24 PM

## 2018-07-24 NOTE — DISCHARGE INSTRUCTIONS
Please follow all post op instructions and follow up appointment time from your physician's office included in your discharge packet.  .         Keep appt with retina

## 2024-11-07 ENCOUNTER — APPOINTMENT (OUTPATIENT)
Dept: GENERAL RADIOLOGY | Facility: HOSPITAL | Age: 69
End: 2024-11-07
Payer: MEDICARE

## 2024-11-07 ENCOUNTER — HOSPITAL ENCOUNTER (EMERGENCY)
Facility: HOSPITAL | Age: 69
Discharge: HOME | End: 2024-11-07
Attending: EMERGENCY MEDICINE
Payer: MEDICARE

## 2024-11-07 ENCOUNTER — APPOINTMENT (OUTPATIENT)
Dept: CT IMAGING | Facility: HOSPITAL | Age: 69
End: 2024-11-07
Payer: MEDICARE

## 2024-11-07 VITALS
RESPIRATION RATE: 18 BRPM | SYSTOLIC BLOOD PRESSURE: 128 MMHG | WEIGHT: 210.98 LBS | DIASTOLIC BLOOD PRESSURE: 58 MMHG | TEMPERATURE: 98.4 F | BODY MASS INDEX: 38.83 KG/M2 | HEART RATE: 62 BPM | HEIGHT: 62 IN | OXYGEN SATURATION: 96 %

## 2024-11-07 DIAGNOSIS — S32.591A OTHER CLOSED FRACTURE OF RIGHT PUBIS, INITIAL ENCOUNTER: Primary | ICD-10-CM

## 2024-11-07 LAB
ALBUMIN SERPL-MCNC: 4 G/DL (ref 3.5–5.2)
ALBUMIN/GLOB SERPL: 1.7 G/DL
ALP SERPL-CCNC: 83 U/L (ref 39–117)
ALT SERPL W P-5'-P-CCNC: 18 U/L (ref 1–33)
ANION GAP SERPL CALCULATED.3IONS-SCNC: 8.8 MMOL/L (ref 5–15)
APTT PPP: 30.9 SECONDS (ref 70–100)
AST SERPL-CCNC: 27 U/L (ref 1–32)
BASOPHILS # BLD AUTO: 0.02 10*3/MM3 (ref 0–0.2)
BASOPHILS NFR BLD AUTO: 0.2 % (ref 0–1.5)
BILIRUB SERPL-MCNC: <0.2 MG/DL (ref 0–1.2)
BUN SERPL-MCNC: 12 MG/DL (ref 8–23)
BUN/CREAT SERPL: 16 (ref 7–25)
CALCIUM SPEC-SCNC: 9.1 MG/DL (ref 8.6–10.5)
CHLORIDE SERPL-SCNC: 103 MMOL/L (ref 98–107)
CO2 SERPL-SCNC: 27.2 MMOL/L (ref 22–29)
CREAT SERPL-MCNC: 0.75 MG/DL (ref 0.57–1)
DEPRECATED RDW RBC AUTO: 42.1 FL (ref 37–54)
EGFRCR SERPLBLD CKD-EPI 2021: 87.4 ML/MIN/1.73
EOSINOPHIL # BLD AUTO: 0.07 10*3/MM3 (ref 0–0.4)
EOSINOPHIL NFR BLD AUTO: 0.6 % (ref 0.3–6.2)
ERYTHROCYTE [DISTWIDTH] IN BLOOD BY AUTOMATED COUNT: 12.8 % (ref 12.3–15.4)
GLOBULIN UR ELPH-MCNC: 2.3 GM/DL
GLUCOSE SERPL-MCNC: 207 MG/DL (ref 65–99)
HCT VFR BLD AUTO: 35.4 % (ref 34–46.6)
HGB BLD-MCNC: 11.6 G/DL (ref 12–15.9)
IMM GRANULOCYTES # BLD AUTO: 0.11 10*3/MM3 (ref 0–0.05)
IMM GRANULOCYTES NFR BLD AUTO: 1 % (ref 0–0.5)
INR PPP: 0.89 (ref 0.9–1.1)
LYMPHOCYTES # BLD AUTO: 0.92 10*3/MM3 (ref 0.7–3.1)
LYMPHOCYTES NFR BLD AUTO: 8.4 % (ref 19.6–45.3)
MCH RBC QN AUTO: 29.4 PG (ref 26.6–33)
MCHC RBC AUTO-ENTMCNC: 32.8 G/DL (ref 31.5–35.7)
MCV RBC AUTO: 89.8 FL (ref 79–97)
MONOCYTES # BLD AUTO: 0.61 10*3/MM3 (ref 0.1–0.9)
MONOCYTES NFR BLD AUTO: 5.6 % (ref 5–12)
NEUTROPHILS NFR BLD AUTO: 84.2 % (ref 42.7–76)
NEUTROPHILS NFR BLD AUTO: 9.26 10*3/MM3 (ref 1.7–7)
NRBC BLD AUTO-RTO: 0 /100 WBC (ref 0–0.2)
PLATELET # BLD AUTO: 241 10*3/MM3 (ref 140–450)
PMV BLD AUTO: 10.1 FL (ref 6–12)
POTASSIUM SERPL-SCNC: 4.2 MMOL/L (ref 3.5–5.2)
PROT SERPL-MCNC: 6.3 G/DL (ref 6–8.5)
PROTHROMBIN TIME: 12.6 SECONDS (ref 12.3–15.1)
RBC # BLD AUTO: 3.94 10*6/MM3 (ref 3.77–5.28)
SODIUM SERPL-SCNC: 139 MMOL/L (ref 136–145)
WBC NRBC COR # BLD AUTO: 10.99 10*3/MM3 (ref 3.4–10.8)

## 2024-11-07 PROCEDURE — 80053 COMPREHEN METABOLIC PANEL: CPT | Performed by: EMERGENCY MEDICINE

## 2024-11-07 PROCEDURE — 85730 THROMBOPLASTIN TIME PARTIAL: CPT | Performed by: EMERGENCY MEDICINE

## 2024-11-07 PROCEDURE — 72192 CT PELVIS W/O DYE: CPT

## 2024-11-07 PROCEDURE — 96374 THER/PROPH/DIAG INJ IV PUSH: CPT

## 2024-11-07 PROCEDURE — 25010000002 MORPHINE PER 10 MG: Performed by: EMERGENCY MEDICINE

## 2024-11-07 PROCEDURE — 71045 X-RAY EXAM CHEST 1 VIEW: CPT

## 2024-11-07 PROCEDURE — 85610 PROTHROMBIN TIME: CPT | Performed by: EMERGENCY MEDICINE

## 2024-11-07 PROCEDURE — 73502 X-RAY EXAM HIP UNI 2-3 VIEWS: CPT

## 2024-11-07 PROCEDURE — 85025 COMPLETE CBC W/AUTO DIFF WBC: CPT | Performed by: EMERGENCY MEDICINE

## 2024-11-07 PROCEDURE — 96375 TX/PRO/DX INJ NEW DRUG ADDON: CPT

## 2024-11-07 PROCEDURE — 99284 EMERGENCY DEPT VISIT MOD MDM: CPT | Performed by: EMERGENCY MEDICINE

## 2024-11-07 PROCEDURE — 73552 X-RAY EXAM OF FEMUR 2/>: CPT

## 2024-11-07 PROCEDURE — 72131 CT LUMBAR SPINE W/O DYE: CPT

## 2024-11-07 PROCEDURE — 93005 ELECTROCARDIOGRAM TRACING: CPT | Performed by: EMERGENCY MEDICINE

## 2024-11-07 PROCEDURE — 25010000002 ONDANSETRON PER 1 MG: Performed by: EMERGENCY MEDICINE

## 2024-11-07 RX ORDER — SODIUM CHLORIDE 0.9 % (FLUSH) 0.9 %
10 SYRINGE (ML) INJECTION AS NEEDED
Status: DISCONTINUED | OUTPATIENT
Start: 2024-11-07 | End: 2024-11-07 | Stop reason: HOSPADM

## 2024-11-07 RX ORDER — ONDANSETRON 2 MG/ML
4 INJECTION INTRAMUSCULAR; INTRAVENOUS ONCE
Status: COMPLETED | OUTPATIENT
Start: 2024-11-07 | End: 2024-11-07

## 2024-11-07 RX ORDER — HYDROCODONE BITARTRATE AND ACETAMINOPHEN 5; 325 MG/1; MG/1
1 TABLET ORAL EVERY 6 HOURS PRN
Qty: 12 TABLET | Refills: 0 | Status: SHIPPED | OUTPATIENT
Start: 2024-11-07

## 2024-11-07 RX ADMIN — ONDANSETRON 4 MG: 2 INJECTION INTRAMUSCULAR; INTRAVENOUS at 17:28

## 2024-11-07 RX ADMIN — Medication 10 ML: at 17:30

## 2024-11-07 RX ADMIN — MORPHINE SULFATE 4 MG: 4 INJECTION, SOLUTION INTRAMUSCULAR; INTRAVENOUS at 17:29

## 2024-11-08 NOTE — ED PROVIDER NOTES
EMERGENCY DEPARTMENT ENCOUNTER    Pt Name: Kendra Lares  MRN: [id number]  Pt :   1955  Room Number:    Date of encounter:  2024  PCP: Enmanuel Brock MD  ED Provider: Jaspreet De La Cruz MD    Historian: Patient      HPI:  Chief Complaint   Patient presents with    Fall     Hip pain, back pain and r leg pain          Context: Kendra Lares is a 69 y.o. female who presents to the ED c/o fall, the patient tripped and fell outside, landing on her right hip, she complains of pain in her right hip rating to her right groin, as well as pain in her low back.  She denies head injury, neck pain, loss consciousness.  She is not anticoagulated      PAST MEDICAL HISTORY  Past Medical History:   Diagnosis Date    Body piercing     EARS    Cancer     REPORTS PRECANCEROUS CELLS ON OVARIES AT AGE 20 - PATIENT HAD A COMPLETE HYSTERECTOMY AT AGE 20 DUE TO THIS    Depression     Diabetes mellitus     Diverticulosis     Fracture     RIGHT ANKLE    GERD (gastroesophageal reflux disease)     [place] (hard of hearing)     WEARS HEARING AIDS    Hypertension     Migraine     Sleep apnea     USES CPAP HS    Stroke     REPORTS HAD 2 EPISODES THAT HAVE NOT BEEN PROVEN AS CVA'S BUT THAT THEY THINK SHE HAD STROKES. MOST RECENT REPORTED TO BE 2016.     Wears partial dentures     UPPER MOUTH         PAST SURGICAL HISTORY  Past Surgical History:   Procedure Laterality Date    CATARACT EXTRACTION W/ INTRAOCULAR LENS IMPLANT Right 2017    Procedure: CATARACT PHACO EXTRACTION WITH INTRAOCULAR LENS IMPLANT RIGHT WITH TORIC LENS;  Surgeon: Aleja Wallace MD;  Location: [facility] OR;  Service:     COLON RESECTION      REPORTS 2 RESECTIONS BY DR LEAL    COLONOSCOPY W/ POLYPECTOMY      ENDOSCOPY      EYE CAPSULOTOMY WITH LASER Right 2018    Procedure: EYE CAPSULOTOMY WITH LASER RIGHT;  Surgeon: Aleja Wallace MD;  Location: [facility] OR;  Service: Ophthalmology    HERNIA REPAIR      UMBILICAL  AND RIGHT INGUINAL, REPORTS X2 SURGERIES IN THE PAST.  DR LEAL X1, DR AVERY X1.    HYSTERECTOMY      TOTAL HYSTERECTOMY AT AGE 20    LAPAROSCOPIC CHOLECYSTECTOMY      RETINAL DETACHMENT REPAIR Right          FAMILY HISTORY  History reviewed. No pertinent family history.      SOCIAL HISTORY  Social History     Socioeconomic History    Marital status:    Tobacco Use    Smoking status: Former     Current packs/day: 0.25     Average packs/day: 0.3 packs/day for 8.0 years (2.0 ttl pk-yrs)     Types: Cigarettes    Smokeless tobacco: Never    Tobacco comments:     REPORTS SHE QUIT APPROXIMATELY 25 YEARS AGO, ONLY ON A SOCIAL LEVEL   Substance and Sexual Activity    Alcohol use: No    Drug use: No    Sexual activity: Defer         ALLERGIES  Patient has no known allergies.        REVIEW OF SYSTEMS  Review of Systems   Constitutional:  Negative for chills and fever.   HENT:  Negative for sore throat and trouble swallowing.    Eyes:  Negative for pain and redness.   Respiratory:  Negative for cough and shortness of breath.    Cardiovascular:  Negative for chest pain and leg swelling.   Gastrointestinal:  Negative for abdominal pain, nausea and vomiting.   Genitourinary:  Negative for dysuria and urgency.   Musculoskeletal:  Positive for back pain. Negative for neck pain.        Right hip, right groin.   Skin:  Negative for rash and wound.   Neurological:  Negative for dizziness and weakness.        All systems reviewed and negative except for those discussed in HPI.       PHYSICAL EXAM    I have reviewed the triage vital signs and nursing notes.    ED Triage Vitals [11/07/24 1600]   Temp Heart Rate Resp BP SpO2   98.4 °F (36.9 °C) 63 18 118/66 97 %      Temp src Heart Rate Source Patient Position BP Location FiO2 (%)   Oral Monitor Sitting Left arm --       Physical Exam  Constitutional:       Appearance: Normal appearance. She is not ill-appearing.   HENT:      Head: Normocephalic and atraumatic.      Right Ear:  External ear normal.      Left Ear: External ear normal.      Nose: Nose normal.      Mouth/Throat:      Mouth: Mucous membranes are moist.      Pharynx: Oropharynx is clear.   Eyes:      Extraocular Movements: Extraocular movements intact.      Conjunctiva/sclera: Conjunctivae normal.      Pupils: Pupils are equal, round, and reactive to light.   Cardiovascular:      Rate and Rhythm: Normal rate and regular rhythm.      Pulses:           Radial pulses are 2+ on the right side and 2+ on the left side.   Pulmonary:      Effort: Pulmonary effort is normal.      Breath sounds: Normal breath sounds.   Abdominal:      General: There is no distension.      Palpations: Abdomen is soft.      Tenderness: There is no abdominal tenderness.   Musculoskeletal:         General: No tenderness or deformity. Normal range of motion.      Cervical back: Normal range of motion and neck supple.      Lumbar back: Tenderness present.      Right hip: Tenderness and bony tenderness present.      Left hip: Normal.      Right lower leg: No edema.      Left lower leg: No edema.   Skin:     General: Skin is warm and dry.      Capillary Refill: Capillary refill takes less than 2 seconds.   Neurological:      General: No focal deficit present.      Mental Status: She is alert and oriented to person, place, and time.            LAB RESULTS  Recent Results (from the past 24 hours)   Comprehensive Metabolic Panel    Collection Time: 11/07/24  5:28 PM    Specimen: Blood   Result Value Ref Range    Glucose 207 (H) 65 - 99 mg/dL    BUN 12 8 - 23 mg/dL    Creatinine 0.75 0.57 - 1.00 mg/dL    Sodium 139 136 - 145 mmol/L    Potassium 4.2 3.5 - 5.2 mmol/L    Chloride 103 98 - 107 mmol/L    CO2 27.2 22.0 - 29.0 mmol/L    Calcium 9.1 8.6 - 10.5 mg/dL    Total Protein 6.3 6.0 - 8.5 g/dL    Albumin 4.0 3.5 - 5.2 g/dL    ALT (SGPT) 18 1 - 33 U/L    AST (SGOT) 27 1 - 32 U/L    Alkaline Phosphatase 83 39 - 117 U/L    Total Bilirubin <0.2 0.0 - 1.2 mg/dL     Globulin 2.3 gm/dL    A/G Ratio 1.7 g/dL    BUN/Creatinine Ratio 16.0 7.0 - 25.0    Anion Gap 8.8 5.0 - 15.0 mmol/L    eGFR 87.4 >60.0 mL/min/1.73   Protime-INR    Collection Time: 11/07/24  5:28 PM    Specimen: Blood   Result Value Ref Range    Protime 12.6 12.3 - 15.1 Seconds    INR 0.89 (L) 0.90 - 1.10   aPTT    Collection Time: 11/07/24  5:28 PM    Specimen: Blood   Result Value Ref Range    PTT 30.9 (L) 70.0 - 100.0 seconds   CBC Auto Differential    Collection Time: 11/07/24  5:28 PM    Specimen: Blood   Result Value Ref Range    WBC 10.99 (H) 3.40 - 10.80 10*3/mm3    RBC 3.94 3.77 - 5.28 10*6/mm3    Hemoglobin 11.6 (L) 12.0 - 15.9 g/dL    Hematocrit 35.4 34.0 - 46.6 %    MCV 89.8 79.0 - 97.0 fL    MCH 29.4 26.6 - 33.0 pg    MCHC 32.8 31.5 - 35.7 g/dL    RDW 12.8 12.3 - 15.4 %    RDW-SD 42.1 37.0 - 54.0 fl    MPV 10.1 6.0 - 12.0 fL    Platelets 241 140 - 450 10*3/mm3    Neutrophil % 84.2 (H) 42.7 - 76.0 %    Lymphocyte % 8.4 (L) 19.6 - 45.3 %    Monocyte % 5.6 5.0 - 12.0 %    Eosinophil % 0.6 0.3 - 6.2 %    Basophil % 0.2 0.0 - 1.5 %    Immature Grans % 1.0 (H) 0.0 - 0.5 %    Neutrophils, Absolute 9.26 (H) 1.70 - 7.00 10*3/mm3    Lymphocytes, Absolute 0.92 0.70 - 3.10 10*3/mm3    Monocytes, Absolute 0.61 0.10 - 0.90 10*3/mm3    Eosinophils, Absolute 0.07 0.00 - 0.40 10*3/mm3    Basophils, Absolute 0.02 0.00 - 0.20 10*3/mm3    Immature Grans, Absolute 0.11 (H) 0.00 - 0.05 10*3/mm3    nRBC 0.0 0.0 - 0.2 /100 WBC       If labs were ordered, I independently reviewed the results and considered them in treating the patient.        RADIOLOGY  CT Pelvis Without Contrast    Result Date: 11/7/2024  FINAL REPORT TECHNIQUE: null CLINICAL HISTORY: fall, right hip pain COMPARISON: null FINDINGS: CT pelvis without contrast Comparison: None Findings: There is a 8 cm anterior pelvic hernia containing a loop of bowel without evidence of associated strangulation or obstruction. Appendix is not seen. Visualized bowel loops are  normal in caliber. Left common iliac vein stent. Urinary bladder is within normal limits. Mildly displaced right obturator ring fracture. Periarticular osteophyte formation at the bilateral hip joints.     IMPRESSION: 1. Right obturator ring fracture. 2. Bowel containing pelvic wall hernia. Authenticated and Electronically Signed by Tasha Ordoñez on 11/07/2024 05:46:29 PM    CT Lumbar Spine Without Contrast    Result Date: 11/7/2024  FINAL REPORT TECHNIQUE: null CLINICAL HISTORY: fall, back pain COMPARISON: null FINDINGS: CT lumbar spine without contrast Comparison: None Findings: Vertebral alignment is within normal limits. No acute fractures or dislocations. Multilevel disc space narrowing and endplate osteophyte formation, as well as facet hypertrophy. Left common iliac vein stent.     IMPRESSION: No acute findings. Authenticated and Electronically Signed by Tasha Ordoñez on 11/07/2024 05:45:58 PM    XR Femur 2 View Right    Result Date: 11/7/2024  PROCEDURE: XR FEMUR 2 VW RIGHT-  HISTORY: fall, pain  COMPARISON: None.  FINDINGS:  A two view exam demonstrates no acute fracture or dislocation. The joint spaces 6 complete loss of the right hip joint space superiorly. Subchondral sclerosis of the acetabulum and cystic degenerative change of the right femoral head noted. There is diffuse osteopenia. Vascular calcifications noted. No soft tissue abnormality is seen. No knee effusion identified.      No acute bony abnormality.      This report was signed and finalized on 11/7/2024 4:53 PM by Laura Mensah MD.      XR Hip With or Without Pelvis 2 - 3 View Right    Result Date: 11/7/2024  PROCEDURE: XR HIP W OR WO PELVIS 2-3 VIEW RIGHT-  HISTORY: fall, pain  COMPARISON: None.  FINDINGS:  A two view exam demonstrates no acute fracture or dislocation. The joint spaces demonstrate complete loss of the right hip joint space superiorly. There is subchondral sclerosis of the acetabulum and cystic degenerative change in the  right femoral head as well as the acetabulum. Diffuse osteopenia identified. There is also moderate narrowing of the left hip joint. Multiple hernia clips are projected over the pelvis. No soft tissue abnormality is seen. Endovascular stent identified.      No acute bony abnormality.  Significant degenerative changes described.    This report was signed and finalized on 11/7/2024 4:52 PM by Laura Mensah MD.      XR Chest 1 View    Result Date: 11/7/2024  PROCEDURE: XR CHEST 1 VW-  HISTORY: fall, pain  COMPARISON: March 17, 2017..  FINDINGS: The heart is upper limits of normal in size, stable. Diffuse osteopenia identified. Aortic mural calcifications noted. Stable chronic change noted.. No new acute infiltrate seen.. The mediastinum is unremarkable. There is no pneumothorax.  There are no acute osseous abnormalities. Apical lordotic positioning noted.      Stable chest..     This report was signed and finalized on 11/7/2024 4:51 PM by Laura Mensah MD.           PROCEDURES    Procedures    Interpretations    O2 Sat: The patients oxygen saturation was 96% on Room Air.  This was independently interpreted by me as Normal    EKG: I reviewed and independently interpreted the EKG as sinus rhythm rate 60 bpm, normal axis, normal intervals, no ST elevation, no T wave inversions    Cardiac Monitoring: I reviewed and independently interpreted the Rhythm Strip as Normal Sinus rhythm rate of 62    Radiology: I ordered and independently reviewed the above noted radiographic studies.  I viewed images of Xray of the right hip, right femur, chest  which showed No pulmonary process and No fracture per my independent interpretation. See radiologist's dictation for official interpretation.     Radiology: I ordered and independently reviewed the above noted radiographic studies.  I viewed images of CT of the lumbar spine, CT pelvis which showed pelvic fracture per my independent interpretation. See radiologist's dictation for official  interpretation    MEDICATIONS GIVEN IN ER    Medications   morphine injection 4 mg (4 mg Intravenous Given 11/7/24 1729)   ondansetron (ZOFRAN) injection 4 mg (4 mg Intravenous Given 11/7/24 1728)         MEDICAL DECISION MAKING, PROGRESS, and CONSULTS    All labs, if obtained, have been independently reviewed by me.  All radiology studies, if obtained, have been reviewed by me and the radiologist dictating the report.  All EKG's, if obtained, have been independently viewed and interpreted by me      Discussion below represents my analysis of pertinent findings related to patient's condition, differential diagnosis, treatment plan and final disposition.      Differential diagnosis:    69-year-old female presenting to the ED with complaint of fall, right hip pain, concerning for right hip fracture, right femur fracture, will obtain preop labs, x-ray of the right hip with pelvis, provide morphine and Zofran for pain.  Patient did not have any head injury, no neck pain, no anticoagulation, clinically cleared for need of CT head or neck    Additional Sources:  External (non-ED) record review:  Primary care note 4/30/2024 regarding headaches , diabetes, hypertension      Orders placed during this visit:  Orders Placed This Encounter   Procedures    [facility] 12.  Standard Walker Folding; Prevents Accomplishing MRADLs; Able to Safely Use Equipment; Mobility Deficit Can Be Sufficiently Resolved By Use of Equipment    XR Hip With or Without Pelvis 2 - 3 View Right    XR Femur 2 View Right    XR Chest 1 View    CT Lumbar Spine Without Contrast    CT Pelvis Without Contrast    Comprehensive Metabolic Panel    Protime-INR    aPTT    CBC Auto Differential    ECG 12 Lead Pre-Op / Pre-Procedure    CBC & Differential         Additional orders considered but not ordered:  None    ED Course:    Consultants:   Dr Lawrence orthopedics    ED Course as of 11/07/24 2153   Thu Nov 07, 2024   1653 X-ray of the right hip is  negative, given the degree of pain, I suspect an occult fracture, will obtain CT pelvis and CT L-spine [CS]   1745 CT scan shows pelvic fracture, obturator ring fracture, I spoke directly to Dr. Lawrence from orthopedics, he agrees with the plan for weightbearing as tolerated, pain control, and outpatient follow-up.  Patient voiced understanding is agreeable with the plan.  She was able to ambulate with a walker prior to discharge. [CS]      ED Course User Index  [CS] Jaspreet De La Cruz MD           After my consideration of clinical presentation and any laboratory/radiology studies obtained, I discussed the findings with the patient/patient representative who is in agreement with the treatment plan and the final disposition. Risks and benefits of discharge were discussed.     AS OF 21:53 EST VITALS:    BP - 128/58  HR - 62  TEMP - 98.4 °F (36.9 °C) (Oral)  O2 SATS - 96%    I reviewed the patients prescription monitoring report if available prior to discharge    DIAGNOSIS  Final diagnoses:   Other closed fracture of right pubis, initial encounter         DISPOSITION  ED Disposition       ED Disposition   Discharge    Condition   Stable    Comment   --                   Please note that portions of this document were completed with voice recognition software.        Jaspreet De La Cruz MD  11/07/24 2073

## (undated) DEVICE — SOL IRRIG H2O 1000ML STRL

## (undated) DEVICE — SKIN MARKER,FINE TIP: Brand: DEVON

## (undated) DEVICE — SOL IRRIG NACL 9PCT 1000ML BTL

## (undated) DEVICE — GLV SURG SENSICARE W/ALOE PF LF 7 STRL

## (undated) DEVICE — CLEAR CORNEAL KNIFE 2.4MM ANG: Brand: SHARPOINT

## (undated) DEVICE — HP CONCL INTREPID COAX I/A CRV .3MM

## (undated) DEVICE — 0.8MM CLEARPORT PARA KNIFE: Brand: SHARPOINT

## (undated) DEVICE — PREP SOL POVIDONE/IODINE BT 4OZ

## (undated) DEVICE — 2.0MM SHARPTOME™ CRESCENT KNIFE ANGLED, BEVEL UP: Brand: SHARPOINT

## (undated) DEVICE — KT POSTOP CATARACT PT CARE

## (undated) DEVICE — GLV SURG SENSICARE W/ALOE PF LF 6.5 STRL

## (undated) DEVICE — GLV SURG SENSICARE W/ALOE PF LF 7.5 STRL

## (undated) DEVICE — PK CATARACT OPTHAMALOGY